# Patient Record
Sex: MALE
[De-identification: names, ages, dates, MRNs, and addresses within clinical notes are randomized per-mention and may not be internally consistent; named-entity substitution may affect disease eponyms.]

---

## 2018-07-19 ENCOUNTER — HOSPITAL ENCOUNTER (EMERGENCY)
Dept: HOSPITAL 14 - H.ER | Age: 33
Discharge: HOME | End: 2018-07-19
Payer: SELF-PAY

## 2018-07-19 VITALS
HEART RATE: 67 BPM | SYSTOLIC BLOOD PRESSURE: 122 MMHG | DIASTOLIC BLOOD PRESSURE: 57 MMHG | TEMPERATURE: 97.7 F | RESPIRATION RATE: 18 BRPM | OXYGEN SATURATION: 97 %

## 2018-07-19 DIAGNOSIS — M43.6: Primary | ICD-10-CM

## 2018-07-19 PROCEDURE — 99282 EMERGENCY DEPT VISIT SF MDM: CPT

## 2018-07-19 PROCEDURE — 96372 THER/PROPH/DIAG INJ SC/IM: CPT

## 2018-07-19 NOTE — ED PDOC
HPI: Back


Time Seen by Provider: 07/19/18 02:19


Chief Complaint (Nursing): Upper Extremity Problem/Injury


Chief Complaint (Provider): neck stiffness


History Per: Patient


History/Exam Limitations: no limitations


Onset/Duration Of Symptoms: Hrs


Current Symptoms Are (Timing): Still Present


Exacerbating Factor(s): Turning, Movement


Additional Complaint(s): 





31 y/o male presents for evaluation of right-sided neck stiffness x 12 hours.  

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 

driving during work; states got worse since then.  Patient reports intermittent 

spasms to area.  Denies fever, numbness/weakness right upper extremity. Little 

improvement with ibuprofen taken 5 hours prior to arrival.  





Past Medical History


Reviewed: Historical Data, Nursing Documentation, Vital Signs


Vital Signs: 


 Last Vital Signs











Temp  97.7 F   07/19/18 02:18


 


Pulse  67   07/19/18 02:18


 


Resp  18   07/19/18 02:18


 


BP  122/57 L  07/19/18 02:18


 


Pulse Ox  97   07/19/18 02:18














- Medical History


PMH: Asthma





- Surgical History


Surgical History: No Surg Hx





- Family History


Family History: States: Unknown Family Hx





- Immunization History


Hx Tetanus Toxoid Vaccination: No


Hx Influenza Vaccination: No


Hx Pneumococcal Vaccination: No





- Home Medications


Home Medications: 


 Ambulatory Orders











 Medication  Instructions  Recorded


 


Cyclobenzaprine [Cyclobenzaprine 10 mg PO BID PRN #14 tab 07/19/18





HCl]  


 


Naproxen [Naprosyn] 500 mg PO Q12 PRN #20 tablet 07/19/18


 


traMADol [Ultram] 50 mg PO Q8 PRN #10 tab 07/19/18














- Allergies


Allergies/Adverse Reactions: 


 Allergies











Allergy/AdvReac Type Severity Reaction Status Date / Time


 


No Known Allergies Allergy   Unverified 01/29/15 19:21














Review of Systems


ROS Statement: Except As Marked, All Systems Reviewed And Found Negative


Musculoskeletal: Positive for: Neck Pain





Physical Exam





- Reviewed


Nursing Documentation Reviewed: Yes


Vital Signs Reviewed: Yes





- Physical Exam


Appears: Positive for: Well, Non-toxic, No Acute Distress


Head Exam: Positive for: ATRAUMATIC, NORMAL INSPECTION, NORMOCEPHALIC


Skin: Positive for: Normal Color


Cardiovascular/Chest: Positive for: Regular Rate, Rhythm


Respiratory: Positive for: Normal Breath Sounds


Back: Positive for: Decreased ROM (limited ROM right lateral tilt, right 

rotation, flexion, extension of neck secondary to pain/spasms).  Negative for: 

L CVA Tenderness, R CVA Tenderness, Vertebral Tenderness


Extremity: Positive for: Normal ROM


Neurologic/Psych: Positive for: Alert, Oriented (x3).  Negative for: Motor/

Sensory Deficits





- ECG


O2 Sat by Pulse Oximetry: 97





- Progress


ED Course And Treament: 


Toradol IM, flexeril PO, tramadol PO





On re-eval, patient reports improvement of pain; sitting up more comfortably, 

neck positioned more midline


Patient educated on findings, discharged with rx naproxen, flexeril, tramadol


Advised warm compresses


Follow up PMD 2-3 days.


Return precautions given











Disposition





- Clinical Impression


Clinical Impression: 


 Torticollis, spasmodic








- Patient ED Disposition


Is Patient to be Admitted: No


Counseled Patient/Family Regarding: Studies Performed, Diagnosis, Need For 

Followup, Rx Given





- Disposition


Referrals: 


[facility] [Outside]


Disposition: Routine/Home


Disposition Time: 04:15


Condition: IMPROVED


Prescriptions: 


Cyclobenzaprine [Cyclobenzaprine HCl] 10 mg PO BID PRN #14 tab


 PRN Reason: Muscle Spasm


Naproxen [Naprosyn] 500 mg PO Q12 PRN #20 tablet


 PRN Reason: Pain, Moderate (4-7)


traMADol [Ultram] 50 mg PO Q8 PRN #10 tab


 PRN Reason: Pain, Severe (8-10)


Instructions:  Torticollis, Adult


Forms:  [facility] (English)